# Patient Record
Sex: MALE | Race: BLACK OR AFRICAN AMERICAN | URBAN - METROPOLITAN AREA
[De-identification: names, ages, dates, MRNs, and addresses within clinical notes are randomized per-mention and may not be internally consistent; named-entity substitution may affect disease eponyms.]

---

## 2023-05-16 ENCOUNTER — TELEMEDICINE (OUTPATIENT)
Dept: INTERNAL MEDICINE CLINIC | Facility: CLINIC | Age: 38
End: 2023-05-16

## 2023-05-16 DIAGNOSIS — J30.2 SEASONAL ALLERGIC RHINITIS, UNSPECIFIED TRIGGER: ICD-10-CM

## 2023-05-16 DIAGNOSIS — R05.1 ACUTE COUGH: Primary | ICD-10-CM

## 2023-05-16 DIAGNOSIS — J45.31 MILD PERSISTENT ASTHMA WITH ACUTE EXACERBATION: ICD-10-CM

## 2023-05-16 PROCEDURE — 99203 OFFICE O/P NEW LOW 30 MIN: CPT | Performed by: INTERNAL MEDICINE

## 2023-05-16 RX ORDER — PROMETHAZINE HYDROCHLORIDE AND CODEINE PHOSPHATE 6.25; 1 MG/5ML; MG/5ML
2.5 SYRUP ORAL EVERY 6 HOURS PRN
Qty: 180 ML | Refills: 0 | Status: SHIPPED | OUTPATIENT
Start: 2023-05-16

## 2023-05-16 RX ORDER — MONTELUKAST SODIUM 10 MG/1
10 TABLET ORAL NIGHTLY
Qty: 30 TABLET | Refills: 0 | Status: SHIPPED | OUTPATIENT
Start: 2023-05-16

## 2023-05-16 RX ORDER — AMOXICILLIN AND CLAVULANATE POTASSIUM 875; 125 MG/1; MG/1
1 TABLET, FILM COATED ORAL 2 TIMES DAILY
Qty: 14 TABLET | Refills: 0 | Status: SHIPPED | OUTPATIENT
Start: 2023-05-16 | End: 2023-05-23

## 2023-05-16 NOTE — PROGRESS NOTES
Subjective:     Patient ID: Mitchel Garcia is a 42year old male. This is a telemedicine visit with live, interactive video and audio. Patient understands and accepts financial responsibility for any deductible, co-insurance and/or co-pays associated with this service. Cough  This is a new problem. The current episode started 1 to 4 weeks ago (3 weeks). The problem has been unchanged. The problem occurs hourly. The cough is productive of sputum and productive of purulent sputum (green phlegm). Associated symptoms include nasal congestion and wheezing. Pertinent negatives include no fever, hemoptysis or shortness of breath. Nothing aggravates the symptoms. He has tried steroid inhaler and a beta-agonist inhaler for the symptoms. His past medical history is significant for asthma and environmental allergies. History/Other:   Review of Systems   Constitutional: Negative. Negative for fever. Respiratory: Positive for cough and wheezing. Negative for hemoptysis and shortness of breath. Cardiovascular: Negative. Allergic/Immunologic: Positive for environmental allergies. No current outpatient medications on file. Allergies:Not on File    No past medical history on file. No past surgical history on file. No family history on file. Social History:       Objective:   Physical Exam  Constitutional:       General: He is not in acute distress. Appearance: He is not ill-appearing or diaphoretic. Comments: Pt coughing during the televisit. Patient did not sound to be in distress over the video, talking in long sentences, and answering questions appropriately   Pulmonary:      Effort: Pulmonary effort is normal. No respiratory distress. Neurological:      Mental Status: He is alert. Assessment & Plan:   (R05.1) Acute cough  (primary encounter diagnosis)  Plan: pt had been coughing since 3 weeks ago and had some green phlegm currently, some wheezing though no SOB.  He said he tested negative for covid before. He had been using his Wixela inhaler bid and albuterol inhaler prn only as well as singulair. He said he typically gets this symptoms this time of the year with change in season. I told him to continue these meds. Will start him on augmentin for a week since purulent phlem already. . He had asked for cough med and not able to take tessalon and guiafenesin due to allergies. He had taken promethazine codeine cough med last year when he has similar symptoms and tolerated. Will erx med , told not to drive or operate machines when taking cough med   Since it can make him drowsy. Pt told to call back, go to ER, if symptoms persist/worsens . Pt agreed.     (J45.31) Mild persistent asthma with acute exacerbation  Plan: see above.     (J30.2) Seasonal allergic rhinitis, unspecified trigger  Plan: see above. Please note that the following visit was completed using two-way, real-time interactive audio and/or video communication. This has been done in good rayshawn to provide continuity of care in the best interest of the provider-patient relationship, due to the ongoing public health crisis/national emergency and because of restrictions of visitation. There are limitations of this visit as no physical exam could be performed. Every conscious effort was taken to allow for sufficient and adequate time. This billing was spent on reviewing labs, medications, radiology tests and decision making. Appropriate medical decision-making and tests are ordered as detailed in the plan of care above          No orders of the defined types were placed in this encounter.       Meds This Visit:  Requested Prescriptions      No prescriptions requested or ordered in this encounter       Imaging & Referrals:  None

## 2023-05-17 ENCOUNTER — TELEPHONE (OUTPATIENT)
Dept: INTERNAL MEDICINE CLINIC | Facility: CLINIC | Age: 38
End: 2023-05-17

## 2023-06-23 ENCOUNTER — TELEPHONE (OUTPATIENT)
Dept: INTERNAL MEDICINE CLINIC | Facility: CLINIC | Age: 38
End: 2023-06-23

## 2023-08-11 RX ORDER — MONTELUKAST SODIUM 10 MG/1
10 TABLET ORAL NIGHTLY
Qty: 90 TABLET | Refills: 3 | Status: SHIPPED | OUTPATIENT
Start: 2023-08-11

## 2023-08-11 NOTE — TELEPHONE ENCOUNTER
Refill passed per [place] [facility] protocol. Requested Prescriptions   Pending Prescriptions Disp Refills    montelukast 10 MG Oral Tab 30 tablet 0     Sig: Take 1 tablet (10 mg total) by mouth nightly. Asthma & COPD Medication Protocol Passed - 8/10/2023 11:30 AM        Passed - In person appointment or virtual visit in the past 6 mos or appointment in next 3 mos     Recent Outpatient Visits              2 months ago Acute cough    [address], Laura Gutierrez, Mamadou Bal MD    Telemedicine          Future Appointments         Provider Department Appt Notes    In 1 week Eleuterio Patrick MD [address] [place] [address] That time of year just always bad on me doc                 promethazine-codeine 6.25-10 MG/5ML Oral Syrup 180 mL 0     Sig: Take 2.5 mL by mouth every 6 (six) hours as needed for cough.        There is no refill protocol information for this order        Future Appointments         Provider Department Appt Notes    In 1 week Eleuterio Patrick MD [address] [place] [address] That time of year just always bad on me doc          Recent Outpatient Visits              2 months ago Acute cough    [address], Laura Gutierrez, Mamadou Bal MD    Telemedicine

## 2023-08-12 RX ORDER — PROMETHAZINE HYDROCHLORIDE AND CODEINE PHOSPHATE 6.25; 1 MG/5ML; MG/5ML
2.5 SYRUP ORAL EVERY 6 HOURS PRN
Qty: 118 ML | Refills: 0 | OUTPATIENT
Start: 2023-08-12

## 2023-08-12 NOTE — TELEPHONE ENCOUNTER
Pt needs an office vsiit not videovisit ; will not refill any meds without seeing me in the clinic.  He may also go to IC

## 2023-08-18 ENCOUNTER — TELEPHONE (OUTPATIENT)
Dept: INTERNAL MEDICINE CLINIC | Facility: CLINIC | Age: 38
End: 2023-08-18

## 2023-08-18 NOTE — TELEPHONE ENCOUNTER
Spoke to pt to inform Dr. Karle Meckel would like to see in person and not via video visit. Pt verbalized understanding and was willing to keep appt for 8/18/2023.

## 2023-10-31 ENCOUNTER — TELEMEDICINE (OUTPATIENT)
Dept: FAMILY MEDICINE CLINIC | Facility: CLINIC | Age: 38
End: 2023-10-31

## 2023-10-31 ENCOUNTER — TELEPHONE (OUTPATIENT)
Dept: FAMILY MEDICINE CLINIC | Facility: CLINIC | Age: 38
End: 2023-10-31

## 2023-10-31 ENCOUNTER — PATIENT MESSAGE (OUTPATIENT)
Dept: FAMILY MEDICINE CLINIC | Facility: CLINIC | Age: 38
End: 2023-10-31

## 2023-10-31 DIAGNOSIS — R09.81 NASAL CONGESTION: ICD-10-CM

## 2023-10-31 DIAGNOSIS — R05.1 ACUTE COUGH: ICD-10-CM

## 2023-10-31 DIAGNOSIS — R05.1 ACUTE COUGH: Primary | ICD-10-CM

## 2023-10-31 RX ORDER — FLUTICASONE PROPIONATE 50 MCG
2 SPRAY, SUSPENSION (ML) NASAL DAILY
Qty: 16 G | Refills: 2 | Status: SHIPPED | OUTPATIENT
Start: 2023-10-31 | End: 2023-10-31

## 2023-10-31 RX ORDER — FLUTICASONE PROPIONATE 50 MCG
2 SPRAY, SUSPENSION (ML) NASAL DAILY
Qty: 16 G | Refills: 2 | Status: SHIPPED | OUTPATIENT
Start: 2023-10-31 | End: 2023-11-30

## 2023-10-31 RX ORDER — PROMETHAZINE HYDROCHLORIDE AND CODEINE PHOSPHATE 6.25; 1 MG/5ML; MG/5ML
2.5 SYRUP ORAL EVERY 6 HOURS PRN
Qty: 120 ML | Refills: 0 | Status: SHIPPED | OUTPATIENT
Start: 2023-10-31 | End: 2023-10-31 | Stop reason: ALTCHOICE

## 2023-10-31 RX ORDER — GUAIFENESIN AND CODEINE PHOSPHATE 100; 10 MG/5ML; MG/5ML
10 SOLUTION ORAL
Qty: 180 ML | Refills: 0 | Status: SHIPPED | OUTPATIENT
Start: 2023-10-31 | End: 2023-11-02

## 2023-10-31 NOTE — TELEPHONE ENCOUNTER
Patient calling again to ask that the medication [facility] transfer medications she prescribed patient this morning be sent to asap: [facility]  P: [address] [facility], [address] [facility] [phone], [phone]   [address] [place] [id number]   Phone: [phone] Fax: [phone]   Hours: Not open 24 hours     I told patient he left a message an hour ago. We need some time.

## 2023-10-31 NOTE — PROGRESS NOTES
HPI:     I spoke to [employer] via video call, verified date of birth, and discussed current concerns. Cough  This is a new problem. The current episode started in the past 7 days. The problem has been unchanged. The cough is Non-productive. Associated symptoms include headaches, nasal congestion, rhinorrhea and a sore throat. Pertinent negatives include no chest pain, chills, ear congestion, ear pain, fever, postnasal drip, rash, shortness of breath or wheezing. Medications:     Current Outpatient Medications   Medication Sig Dispense Refill    fluticasone propionate 50 MCG/ACT Nasal Suspension 2 sprays by Each Nare route daily for 30 doses. 16 g 2    promethazine-codeine 6.25-10 MG/5ML Oral Syrup Take 2.5 mL by mouth every 6 (six) hours as needed for cough. 120 mL 0    montelukast 10 MG Oral Tab Take 1 tablet (10 mg total) by mouth nightly. 90 tablet 3       Allergies:     Tessalon Perles [Be*    HIVES    History:   Annual Physical Never done  Asthma Action Plan Never done  Asthma Control Test Never done  COVID-19 Vaccine(1) Never done  Pneumococcal Vaccine: Birth to 64yrs(1 - PCV) Never done  DTaP,Tdap,and Td Vaccines(1 - Tdap) Never done  Annual Depression Screening Never done  Influenza Vaccine(1) Never done    No LMP for male patient. Past Medical History:     Past Medical History:   Diagnosis Date    Allergic rhinitis     Asthma        Past Surgical History:   No past surgical history on file. Family History:   No family history on file.     Social History:   Social History    Socioeconomic History      Marital status: Unknown      Spouse name: Not on file      Number of children: Not on file      Years of education: Not on file      Highest education level: Not on file    Occupational History      Not on file    Tobacco Use      Smoking status: Never      Smokeless tobacco: Never    Substance and Sexual Activity      Alcohol use: Not on file      Drug use: Not on file      Sexual activity: Not on file    Other Topics      Concerns:        Not on file    Social History Narrative      Not on file    Social Determinants of Health  Financial Resource Strain: Not on file  Food Insecurity: Not on file  Transportation Needs: Not on file  Physical Activity: Not on file  Stress: Not on file  Social Connections: Not on file  Housing Stability: Not on file    Review of Systems:   Review of Systems   Constitutional:  Negative for activity change, chills, fatigue and fever. HENT:  Positive for congestion, rhinorrhea and sore throat. Negative for ear discharge, ear pain, postnasal drip, sinus pressure and sinus pain. Respiratory:  Positive for cough. Negative for chest tightness, shortness of breath and wheezing. Cardiovascular:  Negative for chest pain and palpitations. Gastrointestinal:  Negative for abdominal distention, abdominal pain, blood in stool, constipation, diarrhea, nausea and vomiting. Skin:  Negative for rash. Neurological:  Positive for headaches. There were no vitals filed for this visit. There is no height or weight on file to calculate BMI. Physical Exam:   Physical Exam   Patient alert and orient x3, able to carry on conversation easily, without shortness of breath, coughing, can speak in full sentences. Assessment and Plan[de-identified]     Problem List Items Addressed This Visit    None  Visit Diagnoses       Acute cough    -  Primary    Relevant Medications    promethazine-codeine 6.25-10 MG/5ML Oral Syrup    Nasal congestion        Relevant Medications    fluticasone propionate 50 MCG/ACT Nasal Suspension              Discussed plan of care with pt and pt is in agreement. All questions answered. Pt to call with questions or concerns.

## 2023-10-31 NOTE — TELEPHONE ENCOUNTER
Fluticasone sent to pharmacy requested. Promethazine with codeine will need to come from provider, Smith, I believe promethazine with codeine is no longer available, do you want to switch to guaifenesin with codeine?

## 2023-10-31 NOTE — TELEPHONE ENCOUNTER
guaiFENesin-Codeine 100-10 MG/5ML Oral Syrup 180 mL 0 10/31/2023 11/10/2023    Sig - Route: Take 10 mL by mouth every 4 to 6 hours for 10 days. - Oral    Sent to pharmacy as: guaiFENesin-Codeine 100-10 MG/5ML Oral Syrup    E-Prescribing Status: Receipt confirmed by pharmacy (10/31/2023  4:01 PM CDT)      fluticasone propionate 50 MCG/ACT Nasal Suspension 16 g 2 10/31/2023 11/30/2023    Sig - Route: 2 sprays by Each Nare route daily for 30 doses. - Each Nare    Sent to pharmacy as: Fluticasone Propionate 50 MCG/ACT Nasal Suspension (Flonase)    E-Prescribing Status: Receipt confirmed by pharmacy (10/31/2023  3:46 PM CDT)    [phone], [phone]     MyChart message sent to patient making aware of above sent--->see message.

## 2023-10-31 NOTE — TELEPHONE ENCOUNTER
Patient calling, states he needed the medications prescribed today sent to a different pharmacy:    [facility] in [facility] completed asap, thank you    Message patient on mychart when medications have been sent

## 2023-11-01 ENCOUNTER — TELEPHONE (OUTPATIENT)
Dept: FAMILY MEDICINE CLINIC | Facility: CLINIC | Age: 38
End: 2023-11-01

## 2023-11-01 NOTE — TELEPHONE ENCOUNTER
Tried calling pharmacy for the 3rd time, voice mail machine states pharmacy is unable to take call at this moment, left detailed message to return call back.

## 2023-11-02 RX ORDER — PROMETHAZINE HYDROCHLORIDE AND CODEINE PHOSPHATE 6.25; 1 MG/5ML; MG/5ML
2.5 SYRUP ORAL EVERY 4 HOURS PRN
Qty: 118 ML | Refills: 0 | Status: CANCELLED | OUTPATIENT
Start: 2023-11-02

## 2023-11-02 NOTE — TELEPHONE ENCOUNTER
Routed to Ronak CHIN for advise, thanks. Pharm updated. See [employer] message 10-31-23. Requested Prescriptions     Pending Prescriptions Disp Refills    promethazine-codeine 6.25-10 MG/5ML Oral Syrup 118 mL 0     Sig: Take 2.5 mL by mouth every 4 (four) hours as needed for cough.

## 2023-11-02 NOTE — TELEPHONE ENCOUNTER
Kendra RN working from home and has no phone. She states per Miguelina, patient needs to be seen in person. No medication to be given at this time. Spoke to patient and scheduled appointment. Future Appointments   Date Time Provider Jannie Leung   11/3/2023  2:15 PM Kirt Chiang PA-C [facility] [place]     Spoke to [address] and they do not have his prescription on file. Patient did not fill it.

## 2023-11-03 ENCOUNTER — TELEPHONE (OUTPATIENT)
Dept: INTERNAL MEDICINE CLINIC | Facility: CLINIC | Age: 38
End: 2023-11-03

## 2023-11-03 NOTE — TELEPHONE ENCOUNTER
Patient is requesting a note for work because he had to miss work for the appt. He would like for it to be sent to him My Chart. Please advise.

## 2023-11-03 NOTE — TELEPHONE ENCOUNTER
Spoke to patient to request information on which pharmacy he picked up his medications as he informed FD he didn't need another appt for today as he already had his visit yesterday and he picked up his meds. He stated he did not request the appointment for today but called of work to attend appointment. Requesting letter since he missed work. I explained medication had been canceled since its a narcotic medication and he would need to be seen in person for this and an MD has to prescribe it. I asked what pharmacy he picked up meds from and he stated we had bad connection and just needed a letter to excuse him from work. I requested once more pharmacy information but he disconnected call. I called him again but once more upon requesting name of pharmacy call was dropped.

## 2023-11-03 NOTE — TELEPHONE ENCOUNTER
Called patient to cancel appointment. No answer reached vm stating \"the text free caller you have contacted is not available\".  Left brief message stating to give the office a call back because his appointment needs to be canceled or rescheduled with an MD.

## 2023-11-03 NOTE — TELEPHONE ENCOUNTER
Please notify patient that I do not prescribe Promethazine with codeine. Please advise patient to cancel an appointment.

## 2023-11-28 ENCOUNTER — OFFICE VISIT (OUTPATIENT)
Dept: INTERNAL MEDICINE CLINIC | Facility: CLINIC | Age: 38
End: 2023-11-28

## 2023-11-28 VITALS
SYSTOLIC BLOOD PRESSURE: 126 MMHG | TEMPERATURE: 98 F | OXYGEN SATURATION: 98 % | HEIGHT: 70 IN | WEIGHT: 165.88 LBS | DIASTOLIC BLOOD PRESSURE: 78 MMHG | BODY MASS INDEX: 23.75 KG/M2 | HEART RATE: 94 BPM

## 2023-11-28 DIAGNOSIS — J45.21 MILD INTERMITTENT ASTHMA WITH EXACERBATION: Primary | ICD-10-CM

## 2023-11-28 DIAGNOSIS — J30.9 ALLERGIC RHINITIS, UNSPECIFIED SEASONALITY, UNSPECIFIED TRIGGER: ICD-10-CM

## 2023-11-28 PROCEDURE — 99214 OFFICE O/P EST MOD 30 MIN: CPT | Performed by: INTERNAL MEDICINE

## 2023-11-28 RX ORDER — MONTELUKAST SODIUM 10 MG/1
10 TABLET ORAL NIGHTLY
Qty: 90 TABLET | Refills: 0 | Status: SHIPPED | OUTPATIENT
Start: 2023-11-28

## 2023-11-28 RX ORDER — FLUTICASONE PROPIONATE 110 UG/1
2 AEROSOL, METERED RESPIRATORY (INHALATION) 2 TIMES DAILY
Qty: 1 EACH | Refills: 0 | Status: SHIPPED | OUTPATIENT
Start: 2023-11-28 | End: 2024-11-22

## 2023-11-28 RX ORDER — PROMETHAZINE HYDROCHLORIDE AND CODEINE PHOSPHATE 6.25; 1 MG/5ML; MG/5ML
2.5 SYRUP ORAL EVERY 4 HOURS PRN
Qty: 118 ML | Refills: 0 | Status: SHIPPED | OUTPATIENT
Start: 2023-11-28

## 2023-11-28 RX ORDER — AMOXICILLIN AND CLAVULANATE POTASSIUM 875; 125 MG/1; MG/1
1 TABLET, FILM COATED ORAL 2 TIMES DAILY
Qty: 14 TABLET | Refills: 0 | Status: SHIPPED | OUTPATIENT
Start: 2023-11-28 | End: 2023-12-05

## 2023-11-28 NOTE — PROGRESS NOTES
Subjective:     Patient ID: Rusty Swenson is a 34year old male. Cough  This is a new problem. The current episode started 1 to 4 weeks ago (2 weeks). The problem has been unchanged. The problem occurs every few hours. The cough is Productive of sputum and productive of purulent sputum. Associated symptoms include ear pain, nasal congestion, postnasal drip, rhinorrhea and wheezing. Pertinent negatives include no fever, hemoptysis or shortness of breath. He has tried nothing for the symptoms. The treatment provided no relief. His past medical history is significant for asthma. History/Other:   Review of Systems   Constitutional: Negative. Negative for fever. HENT:  Positive for ear pain, postnasal drip, rhinorrhea, sinus pressure and sinus pain. Eyes: Negative. Respiratory:  Positive for cough and wheezing. Negative for hemoptysis and shortness of breath. Cardiovascular: Negative. Gastrointestinal: Negative. Genitourinary: Negative. Current Outpatient Medications   Medication Sig Dispense Refill    montelukast 10 MG Oral Tab Take 1 tablet (10 mg total) by mouth nightly. 90 tablet 3    promethazine-codeine 6.25-10 MG/5ML Oral Syrup Take 2.5 mL by mouth every 4 (four) hours as needed for cough. (Patient not taking: Reported on 11/28/2023) 118 mL 0    fluticasone propionate 50 MCG/ACT Nasal Suspension 2 sprays by Each Nare route daily for 30 doses. (Patient not taking: Reported on 11/28/2023) 16 g 2     Allergies: Allergies   Allergen Reactions    Tessalon Perles [Benzonatate-Fd&C Yellow #10] HIVES       Past Medical History:   Diagnosis Date    Allergic rhinitis     Asthma       History reviewed. No pertinent surgical history.    Family History   Problem Relation Age of Onset    Diabetes Paternal Aunt       Social History:   Social History     Socioeconomic History    Marital status: Unknown   Tobacco Use    Smoking status: Never     Passive exposure: Never    Smokeless tobacco: Never Vaping Use    Vaping Use: Never used   Substance and Sexual Activity    Alcohol use: Never    Drug use: Never        Objective:   Physical Exam  Constitutional:       General: He is not in acute distress. Appearance: He is well-developed. He is not ill-appearing, toxic-appearing or diaphoretic. HENT:      Head: Normocephalic and atraumatic. Right Ear: Tympanic membrane, ear canal and external ear normal.      Left Ear: Tympanic membrane, ear canal and external ear normal.      Nose: Nose normal.      Mouth/Throat:      Pharynx: No oropharyngeal exudate. Eyes:      General: No scleral icterus. Right eye: No discharge. Left eye: No discharge. Conjunctiva/sclera: Conjunctivae normal.      Pupils: Pupils are equal, round, and reactive to light. Neck:      Vascular: No JVD. Cardiovascular:      Rate and Rhythm: Normal rate and regular rhythm. Heart sounds: Normal heart sounds. No murmur heard. No gallop. Pulmonary:      Effort: Pulmonary effort is normal. No respiratory distress. Breath sounds: Rhonchi present. No wheezing or rales. Comments: Occasional rhonchi  Abdominal:      General: Bowel sounds are normal. There is no distension. Palpations: Abdomen is soft. There is no mass. Tenderness: There is no abdominal tenderness. There is no guarding or rebound. Musculoskeletal:         General: No tenderness. Normal range of motion. Cervical back: Normal range of motion and neck supple. No rigidity or tenderness. Right lower leg: No edema. Left lower leg: No edema. Lymphadenopathy:      Cervical: No cervical adenopathy. Skin:     General: Skin is warm and dry. Findings: No rash. Neurological:      Mental Status: He is alert and oriented to person, place, and time.          Assessment & Plan:   (J45.21) Mild intermittent asthma with exacerbation  (primary encounter diagnosis)  Plan: XR CHEST PA + LAT CHEST (CPT=71046)        Pt's current flare up started 2 weeks ago, his nasal/sinus congestion and postnsal drip persist with purulent mucus. I told him will start him on augmentin for sinusitis, and for his asthma, gave hm flovent inhaler and albuterol inhaler, will also refill his singulair. I also told pt to get cxr today. I did give him refill for his promethazine with codeine cough med. Pt warned about drowsiness and not operate machines if he takes cough med. Pt to call back if symptoms persist/worsens.     (J30.9) Allergic rhinitis, unspecified seasonality, unspecified trigger  Plan: pt to continue using singulair and also use otc flonase NS. Pt also told to come back next month for his annual physical/healt sceenings. No orders of the defined types were placed in this encounter.       Meds This Visit:  Requested Prescriptions      No prescriptions requested or ordered in this encounter       Imaging & Referrals:  None

## 2024-01-03 ENCOUNTER — TELEMEDICINE (OUTPATIENT)
Dept: INTERNAL MEDICINE CLINIC | Facility: CLINIC | Age: 39
End: 2024-01-03

## 2024-01-03 DIAGNOSIS — R05.1 ACUTE COUGH: Primary | ICD-10-CM

## 2024-01-03 NOTE — PROGRESS NOTES
Subjective:     Patient ID: Eric Diaz is a 38 year old male.    This is a telemedicine visit with live, interactive video and audio.      Patient understands and accepts financial responsibility for any deductible, co-insurance and/or co-pays associated with this service.     Patient presents today thru mychart video. Pt asking for refill of cough med. Pt states his coughing and cold symptoms had recurred about a week ago. Pt was seen back 5 weeks ago, and was treated for asthma flare up and sinusitis which he said has gotten better. He was asked then to get cxr which he never did do.   He said his symptoms came back or recurred a week ago.         History/Other:   Review of Systems   Constitutional: Negative.    HENT:  Positive for congestion, postnasal drip and rhinorrhea.    Respiratory:  Positive for cough. Negative for shortness of breath and wheezing.      Current Outpatient Medications   Medication Sig Dispense Refill    promethazine-codeine 6.25-10 MG/5ML Oral Syrup Take 2.5 mL by mouth every 4 (four) hours as needed for cough. 118 mL 0    montelukast 10 MG Oral Tab Take 1 tablet (10 mg total) by mouth nightly. 90 tablet 0    fluticasone propionate (FLOVENT HFA) 110 MCG/ACT Inhalation Aerosol Inhale 2 puffs into the lungs 2 (two) times daily. 1 each 0     Allergies:  Allergies   Allergen Reactions    Tessalon Perles [Benzonatate-Fd&C Yellow #10] HIVES       Past Medical History:   Diagnosis Date    Allergic rhinitis     Asthma       No past surgical history on file.   Family History   Problem Relation Age of Onset    Diabetes Paternal Aunt       Social History:   Social History     Socioeconomic History    Marital status: Unknown   Tobacco Use    Smoking status: Never     Passive exposure: Never    Smokeless tobacco: Never   Vaping Use    Vaping Use: Never used   Substance and Sexual Activity    Alcohol use: Never    Drug use: Never        Objective:   Physical Exam  Constitutional:       General: He is  not in acute distress.     Appearance: He is not ill-appearing, toxic-appearing or diaphoretic.      Comments: Patient did not sound to be in distress over the video, talking in long sentences, and answering questions appropriately      Pulmonary:      Effort: Pulmonary effort is normal. No respiratory distress.   Neurological:      Mental Status: He is alert.         Assessment & Plan:   (R05.1) Acute cough  (primary encounter diagnosis)  Plan: pt states had gotten better since last ov late Nov 2023 and states symptoms recurred about a week ago. Asking for refill of cough med. He had not done his cxr as advised before which I told him he needs to do before I could even prescribe more med for him. Pt agreed and will do it.  Call back or go to local IC if symptoms persist/worsens.        Please note that the following visit was completed using two-way, real-time interactive audio and/or video communication.  This has been done in good rayshawn to provide continuity of care in the best interest of the provider-patient relationship, due to the ongoing public health crisis/national emergency and because of restrictions of visitation.  There are limitations of this visit as no physical exam could be performed.  Every conscious effort was taken to allow for sufficient and adequate time.  This billing was spent on reviewing labs, medications, radiology tests and decision making.  Appropriate medical decision-making and tests are ordered as detailed in the plan of care above     No orders of the defined types were placed in this encounter.      Meds This Visit:  Requested Prescriptions      No prescriptions requested or ordered in this encounter       Imaging & Referrals:  None

## 2024-02-01 ENCOUNTER — PATIENT MESSAGE (OUTPATIENT)
Dept: INTERNAL MEDICINE CLINIC | Facility: CLINIC | Age: 39
End: 2024-02-01

## 2024-02-01 DIAGNOSIS — J45.909 ASTHMA, UNSPECIFIED ASTHMA SEVERITY, UNSPECIFIED WHETHER COMPLICATED, UNSPECIFIED WHETHER PERSISTENT: Primary | ICD-10-CM

## 2024-02-02 RX ORDER — PROMETHAZINE HYDROCHLORIDE AND CODEINE PHOSPHATE 6.25; 1 MG/5ML; MG/5ML
2.5 SYRUP ORAL EVERY 4 HOURS PRN
Qty: 118 ML | Refills: 0 | Status: CANCELLED | OUTPATIENT
Start: 2024-02-02

## 2024-02-02 NOTE — TELEPHONE ENCOUNTER
Sandra Rios, RN 2/2/2024 9:35 AM CST        ----- Message -----  From: Eric Diaz  Sent: 2/1/2024 12:15 PM CST  To: Em Triage Support  Subject: Hey good afternoon doctor     I had an appointment today but don’t know what happened I mad arrangements to see you today sir

## 2024-02-02 NOTE — TELEPHONE ENCOUNTER
Please review; protocol failed/No Protocol    No Asthma/COPD listed on problem list.     From telemedicine visit 01/03/2024  (R05.1) Acute cough  (primary encounter diagnosis)  Plan: pt states had gotten better since last ov late Nov 2023 and states symptoms recurred about a week ago. Asking for refill of cough med. He had not done his cxr as advised before which I told him he needs to do before I could even prescribe more med for him. Pt agreed and will do it.  Call back or go to local IC if symptoms persist/worsens.   Requested Prescriptions   Pending Prescriptions Disp Refills    promethazine-codeine 6.25-10 MG/5ML Oral Syrup 118 mL 0     Sig: Take 2.5 mL by mouth every 4 (four) hours as needed for cough.       There is no refill protocol information for this order       montelukast 10 MG Oral Tab 90 tablet 0     Sig: Take 1 tablet (10 mg total) by mouth nightly.       Asthma & COPD Medication Protocol Passed - 2/1/2024 12:12 PM        Passed - In person appointment or virtual visit in the past 6 mos or appointment in next 3 mos     Recent Outpatient Visits              1 month ago Acute cough    [facility], Gian Rosa MD    Telemedicine    2 months ago Mild intermittent asthma with exacerbation    [facility] Gian Rosa MD    Office Visit    3 months ago Acute cough    [facility] [place], Minhxuyen, PA-C    Telemedicine    8 months ago Acute cough    [facility], Gian Rosa MD    Telemedicine                        fluticasone propionate (FLOVENT HFA) 110 MCG/ACT Inhalation Aerosol 1 each 0     Sig: Inhale 2 puffs into the lungs 2 (two) times daily.       Asthma & COPD Medication Protocol Passed - 2/1/2024 12:12 PM        Passed - In person appointment or virtual visit in the past 6 mos or appointment in next 3 mos      Recent Outpatient Visits              1 month ago Acute cough    [facility], Gian Rosa MD    Telemedicine    2 months ago Mild intermittent asthma with exacerbation    [facility], Gian Rosa MD    Office Visit    3 months ago Acute cough    [facility], [place], Minhxuyen, PA-C    Telemedicine    8 months ago Acute cough    [facility], Gian Rosa MD    Telemedicine                           Recent Outpatient Visits              1 month ago Acute cough    [facility], Gian Rosa MD    Telemedicine    2 months ago Mild intermittent asthma with exacerbation    [facility], Gian Rosa MD    Office Visit    3 months ago Acute cough    [facility], [place], Minhxuyen, PA-C    Telemedicine    8 months ago Acute cough    [facility], Gian Rosa MD    Telemedicine

## 2024-02-03 RX ORDER — FLUTICASONE PROPIONATE 110 UG/1
2 AEROSOL, METERED RESPIRATORY (INHALATION) 2 TIMES DAILY
Qty: 1 EACH | Refills: 0 | Status: SHIPPED | OUTPATIENT
Start: 2024-02-03 | End: 2025-01-28

## 2024-02-03 RX ORDER — MONTELUKAST SODIUM 10 MG/1
10 TABLET ORAL NIGHTLY
Qty: 30 TABLET | Refills: 0 | Status: SHIPPED | OUTPATIENT
Start: 2024-02-03

## 2024-02-03 NOTE — TELEPHONE ENCOUNTER
Will only refill monteleukast and inhaler for a month; already been told before to do cxr  and should also do pft; order are in epic. I can't continue to refilll these meds if he doesn't do tests. Will not refill cough med as already told him on last televisit.

## 2024-02-05 NOTE — TELEPHONE ENCOUNTER
Spoke with pt and MD message below given.  Pt verb understanding    Pt is driving and agrees to my chart message with phone number to schedule imaging and PFT's.  My chart message sent.